# Patient Record
Sex: FEMALE | Race: WHITE
[De-identification: names, ages, dates, MRNs, and addresses within clinical notes are randomized per-mention and may not be internally consistent; named-entity substitution may affect disease eponyms.]

---

## 2018-04-04 ENCOUNTER — HOSPITAL ENCOUNTER (EMERGENCY)
Dept: HOSPITAL 80 - FED | Age: 68
Discharge: HOME | End: 2018-04-04
Payer: COMMERCIAL

## 2018-04-04 VITALS
SYSTOLIC BLOOD PRESSURE: 143 MMHG | RESPIRATION RATE: 18 BRPM | HEART RATE: 82 BPM | OXYGEN SATURATION: 97 % | DIASTOLIC BLOOD PRESSURE: 75 MMHG

## 2018-04-04 VITALS — TEMPERATURE: 98.6 F

## 2018-04-04 DIAGNOSIS — I10: ICD-10-CM

## 2018-04-04 DIAGNOSIS — J18.9: Primary | ICD-10-CM

## 2018-04-04 DIAGNOSIS — Z87.891: ICD-10-CM

## 2018-04-04 PROCEDURE — 71046 X-RAY EXAM CHEST 2 VIEWS: CPT

## 2018-04-04 PROCEDURE — 99283 EMERGENCY DEPT VISIT LOW MDM: CPT

## 2018-04-04 NOTE — EDPHY
H & P


Stated Complaint: COUGH/SOB DX BRONCHITIS/CHEST BURNS WITH BREATHING


Time Seen by Provider: 04/04/18 18:56


HPI/ROS: 





CHIEF COMPLAINT:  Cough





HISTORY OF PRESENT ILLNESS:  The patient presents to the ED with fever, chest 

congestion and cough which has been present since Sunday.  She saw her primary 

care provider who diagnosed her with a likely bronchitis.  She has been taking 

Tylenol at home but continued to have symptoms of cough and shortness of 

breath.  She denies any asymmetric calf pain or swelling.  She denies any 

vomiting or diarrhea.  She denies additional complaints.





REVIEW OF SYSTEMS:


A comprehensive 10 point review of systems is otherwise negative aside from 

elements mentioned in the history of present illness.


Source: Patient


Exam Limitations: No limitations





- Personal History


Current Tetanus/Diphtheria Vaccine: Yes





- Medical/Surgical History


Hx Asthma: No


Hx Chronic Respiratory Disease: No


Hx Diabetes: No


Hx Cardiac Disease: No


Hx Renal Disease: No


Hx Cirrhosis: No


Hx Alcoholism: No


Hx HIV/AIDS: No


Hx Splenectomy or Spleen Trauma: No


Other PMH: HTN/pna





- Social History


Smoking Status: Former smoker





- Physical Exam


Exam: 





General Appearance:  Alert, no distress


Eyes:  Pupils equal and round no pallor or injection


ENT, Mouth:  Mucous membranes moist


Respiratory:  There are no retractions, lungs are clear to auscultation


Cardiovascular:  Regular rate and rhythm


Gastrointestinal:  Abdomen is soft and nontender, no masses, bowel sounds normal


Neurological:  A&O, normal motor function, normal sensory exam, normal cranial 

nerves


Skin:  Warm and dry, no rashes


Musculoskeletal:  Neck is supple nontender


Extremities:  symmetrical, full range of motion








Constitutional: 


 Initial Vital Signs











Temperature (C)  36.7 C   04/04/18 18:27


 


Heart Rate  87   04/04/18 18:27


 


Respiratory Rate  20   04/04/18 18:27


 


Blood Pressure  152/95 H  04/04/18 18:27


 


O2 Sat (%)  95   04/04/18 18:27








 











O2 Delivery Mode               Room Air














Allergies/Adverse Reactions: 


 





amoxicillin Allergy (Verified 04/04/18 18:26)


 








Home Medications: 














 Medication  Instructions  Recorded


 


Albuterol [Ventolin Hfa Inhaler] 2 puffs IH QID PRN #1 mdi 04/04/18


 


Losartan Potassium  04/04/18


 


levOFLOXACIN [levAQUIN (*)] 750 mg PO DAILY #9 tab 04/04/18














Medical Decision Making





- Diagnostics


Imaging Results: 


 Imaging Impressions





Chest X-Ray  04/04/18 19:11


Impression:


1. Dense right middle lobe consolidation presumably pneumonia with follow up 

suggested to ensure complete resolution.


2. See above report for additional findings..











ED Course/Re-evaluation: 





The patient presents to the ED for evaluation of a persistent cough.  She has 

had a subjective fever over the past several days.





The patient did have a chest x-ray which demonstrates a right middle lobe 

pneumonia.





The patient's vital signs are stable and she is not acutely hypoxemic.  She 

appears appropriate for outpatient management.





The patient will be started on Levaquin and given a prescription for albuterol








Differential Diagnosis: 





Differential diagnosis considered includes asthma, bronchitis, pneumonia





- Data Points


Medications Given: 


 








Discontinued Medications





Albuterol (Proventil Neb)  3 ml IH EDNOW ONE


   Stop: 04/04/18 19:23


   Last Admin: 04/04/18 19:36 Dose:  3 ml








Departure





- Departure


Disposition: Home, Routine, Self-Care


Clinical Impression: 


 Pneumonia





Condition: Good


Instructions:  Community Acquired Pneumonia (ED)


Additional Instructions: 


1. Please take antibiotics as directed for next 10 days.


2. Use albuterol inhaler as needed for shortness of breath.


3. Return to the ED for markedly worsening symptoms or other concerns.


4. Please follow-up with your primary care provider as scheduled.  


5. I recommend repeating your x-ray in 2 weeks to ensure the pneumonia has 

cleared.